# Patient Record
Sex: FEMALE | Race: WHITE | URBAN - METROPOLITAN AREA
[De-identification: names, ages, dates, MRNs, and addresses within clinical notes are randomized per-mention and may not be internally consistent; named-entity substitution may affect disease eponyms.]

---

## 2018-01-29 ENCOUNTER — HOSPITAL ENCOUNTER (OUTPATIENT)
Dept: LAB | Age: 53
Discharge: HOME | End: 2018-01-29
Payer: COMMERCIAL

## 2018-01-29 ENCOUNTER — OFFICE VISIT (OUTPATIENT)
Dept: OBGYN CLINIC | Age: 53
End: 2018-01-29

## 2018-01-29 VITALS
SYSTOLIC BLOOD PRESSURE: 133 MMHG | BODY MASS INDEX: 35.5 KG/M2 | DIASTOLIC BLOOD PRESSURE: 86 MMHG | WEIGHT: 188 LBS | RESPIRATION RATE: 18 BRPM | OXYGEN SATURATION: 100 % | HEART RATE: 87 BPM | HEIGHT: 61 IN

## 2018-01-29 DIAGNOSIS — Z01.411 ENCOUNTER FOR GYNECOLOGICAL EXAMINATION WITH ABNORMAL FINDING: Primary | ICD-10-CM

## 2018-01-29 DIAGNOSIS — R10.2 PELVIC PAIN IN FEMALE: ICD-10-CM

## 2018-01-29 PROCEDURE — 88175 CYTOPATH C/V AUTO FLUID REDO: CPT | Performed by: OBSTETRICS & GYNECOLOGY

## 2018-01-29 PROCEDURE — 87624 HPV HI-RISK TYP POOLED RSLT: CPT | Performed by: OBSTETRICS & GYNECOLOGY

## 2018-01-29 RX ORDER — IBUPROFEN 200 MG
TABLET ORAL
COMMUNITY

## 2018-01-29 NOTE — PROGRESS NOTES
Subjective:   46 y.o. female for Well Woman Check. Patient's last menstrual period was 2018. Social History: single partner, contraception - none. Pertinent past medical hstory: no history of HTN, DVT, CAD, DM, liver disease, migraines or smoking. Current Outpatient Prescriptions   Medication Sig Dispense Refill    ibuprofen (ADVIL) 200 mg tablet Take  by mouth.  adalimumab (HUMIRA PEN) 40 mg/0.8 mL injection pen by SubCUTAneous route once. No Known Allergies  Past Medical History:   Diagnosis Date    Abnormal Papanicolaou smear of cervix     colpo over 5 years    AS (ankylosing spondylitis) (HCC)     Fibrocystic breast     UTI (urinary tract infection)      Past Surgical History:   Procedure Laterality Date    HX  SECTION      HX GYN       Family History   Problem Relation Age of Onset    Breast Cancer Mother     No Known Problems Father     Breast Cancer Sister     Ovarian Cancer Maternal Aunt      Social History   Substance Use Topics    Smoking status: Never Smoker    Smokeless tobacco: Not on file    Alcohol use Yes      Comment: occasinally        ROS:  Feeling well. No dyspnea or chest pain on exertion. No abdominal pain, change in bowel habits, black or bloody stools. No urinary tract symptoms. GYN ROS: no breast pain or new or enlarging lumps on self exam, no discharge, she complains of irregular menses and of intermittent bilateral pelvic pain. No neurological complaints. Objective:     Visit Vitals    /86 (BP 1 Location: Right arm, BP Patient Position: Sitting)    Pulse 87    Resp 18    Ht 5' 1\" (1.549 m)    Wt 188 lb (85.3 kg)    LMP 2018    SpO2 100%    BMI 35.52 kg/m2     The patient appears well, alert, oriented x 3, in no distress. ENT normal.  Neck supple. No adenopathy or thyromegaly. PERRY. Lungs are clear, good air entry, no wheezes, rhonchi or rales. S1 and S2 normal, no murmurs, regular rate and rhythm.  Abdomen soft without tenderness, guarding, mass or organomegaly. Extremities show no edema, normal peripheral pulses. Neurological is normal, no focal findings. BREAST EXAM: breasts appear normal, no suspicious masses, no skin or nipple changes or axillary nodes, symmetric fibrous changes in both upper outer quadrants    PELVIC EXAM: VULVA: normal appearing vulva with no masses, tenderness or lesions, VAGINA: normal appearing vagina with normal color and discharge, no lesions, CERVIX: normal appearing cervix without discharge or lesions, UTERUS: uterus is normal size, shape, consistency with mild fundal tenderness, ADNEXA: normal adnexa in size, mild bilateral tenderness and no masses, PAP: Pap smear done today, HPV test    Assessment/Plan:   well woman  Pelvic pain, pelvic ultrasound ordered. Will contact the patient with the report.   mammogram  pap smear  return annually or prn